# Patient Record
Sex: MALE | Race: WHITE | NOT HISPANIC OR LATINO | Employment: OTHER | URBAN - METROPOLITAN AREA
[De-identification: names, ages, dates, MRNs, and addresses within clinical notes are randomized per-mention and may not be internally consistent; named-entity substitution may affect disease eponyms.]

---

## 2018-03-13 NOTE — PROGRESS NOTES
Assessment    1  Encounter for preventive health examination (V70 0) (Z00 00)    Plan  Hyperlipidemia    · Simvastatin 20 MG Oral Tablet; TAKE 1 TABLET AT BEDTIME  Hypertension    · Lisinopril-Hydrochlorothiazide 20-12 5 MG Oral Tablet; TAKE ONE TABLET BY  MOUTH ONCE DAILY   · (1) COMPREHENSIVE METABOLIC PANEL; Status:Active; Requested LRZ:[id number];     Discussion/Summary  Impression: health maintenance visit  Prostate cancer screening: prostate cancer screening is current and 8/15 0 5  Colorectal cancer screening: colorectal cancer screening is current and 2/14  The do yearly Flu shot Do tetanus shot if cuts self  He was advised to be evaluated by an optometrist and a dentist      He has been feeling well, he will redo fasting CMP blood test and continue with medication as is  Recheck here 8 months, call sooner as needed  He will also see ENT in follow-up  Chief Complaint  reg yrly check      History of Present Illness  HPI: He was last here in September, he has been feeling well  He is using his blood pressure medication along with statin, blood pressure is well-controlled here today  He has not yet seen ENT in follow-up but plans to set this up No new issue w dysphagia/ speech /chronic hoarseness      Review of Systems    Constitutional: No fever or chills, feels well, no tiredness, no recent weight gain or weight loss  Eyes: No complaints of eye pain, no red eyes, no discharge from eyes, no itchy eyes  ENT: No increased hoarseness or difficulty swallowing, but no earache, no nosebleeds, no sore throat and no nasal discharge  Respiratory: No complaints of shortness of breath, no wheezing, no cough, no SOB on exertion, no orthopnea or PND  Genitourinary: no dysuria  Musculoskeletal: No complaints of arthralgia, no myalgias, no joint swelling or stiffness, no limb pain or swelling  Integumentary: No complaints of skin rash or skin lesions, no itching, no skin wound, no dry skin  Neurological: No compliants of headache, no confusion, no convulsions, no numbness or tingling, no dizziness or fainting, no limb weakness, no difficulty walking  Psychiatric: Is not suicidal, no sleep disturbances, no anxiety or depression, no change in personality, no emotional problems  Endocrine: No complaints of proptosis, no hot flashes, no muscle weakness, no erectile dysfunction, no deepening of the voice, no feelings of weakness  Hematologic/Lymphatic: No complaints of swollen glands, no swollen glands in the neck, does not bleed easily, no easy bruising  Active Problems    1  Back pain (724 5) (M54 9)   2  Hoarseness (784 42) (R49 0)   3  Hyperlipidemia (272 4) (E78 5)   4  Hypertension (401 9) (I10)   5  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)   6  Presbycusis (388 01) (H91 10)    Past Medical History    · History of Diverticulosis (562 10) (K57 90)   · History of acute sinusitis (V12 69) (Z87 09)   · Hyperlipidemia (272 4) (E78 5)   · Hypertension (401 9) (I10)   · History of Inguinal hernia (550 90) (K40 90)   · History of Uncomplicated alcohol abuse (305 00) (F10 10)    Surgical History    · History of Complete Colonoscopy   · History of Inguinal Hernia Repair   · History of Inguinal Hernia Repair    Family History  Mother    · Family history of Breast Cancer (V16 3)   · Family history of Hypertension (V17 49)  Father    · Family history of Colon Cancer (V16 0)   · Family history of lymphoma (V16 7) (Z80 2)    Social History    · Employment History: (V62 1)   · [employer]   · Former smoker (V15 82) (D45 092)   · stopped 1996   · Marital History - Single   · monogamous   · Stopped Drinking Alcohol   · quit 1996    Current Meds   1  Cetirizine HCl 10 MG CAPS; TAKE 1 CAPSULE Daily PRN allergy At Bedtime; Therapy: (Recorded:09Jun2016) to Recorded   2  Glucosamine-Chondroitin Oral Tablet; uses daily; Therapy: 92PCB1982 to (Last RB:[id number]) Ordered   3   Ibuprofen 800 MG Oral Tablet; uses sparingly for low back pain; Therapy: 29Apr2015 to Recorded   4  Lisinopril-Hydrochlorothiazide 20-12 5 MG Oral Tablet; TAKE ONE TABLET BY MOUTH   ONCE DAILY; Therapy: 06JPU1719 to (Evaluate:81Xwf9910)  Requested for: 24Yfe8483; Last   Rx:[id number] Ordered   5  Omega-3-6-9 Oral Capsule; uses daily; Therapy: 20JRX8782 to (Last UU:[id number]) Ordered   6  Simvastatin 20 MG Oral Tablet; TAKE 1 TABLET DAILY AT BEDTIME; Therapy: 64Arw6204 to (Evaluate:46Lme3498)  Requested for: 94QMQ9848; Last   Rx:03Mar2015 Ordered    Allergies    1  No Known Drug Allergies    Vitals   Recorded: 64JKU4871 03:29PM   Heart Rate 80   Systolic 512   Diastolic 64   Height 5 ft 11 5 in   Weight 191 lb 4 oz   BMI Calculated 26 3   BSA Calculated 2 08     Physical Exam    Constitutional   General appearance: No acute distress, well appearing and well nourished  Chronic hoarseness of voice appears same  Eyes   Conjunctiva and lids: No erythema, swelling or discharge  Ears, Nose, Mouth, and Throat   Oropharynx: Normal with no erythema, edema, exudate or lesions  Neck   Neck: Supple, symmetric, trachea midline, no masses  Thyroid: Normal, no thyromegaly  Pulmonary   Auscultation of lungs: Clear to auscultation  Cardiovascular   Auscultation of heart: Normal rate and rhythm, normal S1 and S2, no murmurs  Carotid pulses: 2+ bilaterally  No edema  Abdomen   Abdomen: Non-tender, no masses  Lymphatic   Palpation of lymph nodes in neck: No lymphadenopathy  Musculoskeletal   Gait and station: Normal     Stability: Normal     Neurologic   Cortical function: Normal mental status  Coordination: Normal finger to nose and heel to shin  Psychiatric   Judgment and insight: Normal     Orientation to person, place and time: Normal     Recent and remote memory: Intact      Mood and affect: Normal        Signatures   Electronically signed by : Nito Turner DO; Jun 9 2016  5:21PM EST                       (Author)

## 2024-09-26 ENCOUNTER — OFFICE VISIT (OUTPATIENT)
Dept: URGENT CARE | Facility: CLINIC | Age: 69
End: 2024-09-26
Payer: COMMERCIAL

## 2024-09-26 VITALS
HEIGHT: 72 IN | SYSTOLIC BLOOD PRESSURE: 119 MMHG | RESPIRATION RATE: 18 BRPM | OXYGEN SATURATION: 97 % | TEMPERATURE: 98 F | HEART RATE: 85 BPM | BODY MASS INDEX: 23.57 KG/M2 | DIASTOLIC BLOOD PRESSURE: 80 MMHG | WEIGHT: 174 LBS

## 2024-09-26 DIAGNOSIS — H61.21 HEARING LOSS OF RIGHT EAR DUE TO CERUMEN IMPACTION: ICD-10-CM

## 2024-09-26 DIAGNOSIS — S01.311A LACERATION OF RIGHT EAR CANAL, INITIAL ENCOUNTER: Primary | ICD-10-CM

## 2024-09-26 PROCEDURE — 69209 REMOVE IMPACTED EAR WAX UNI: CPT | Performed by: ORTHOPAEDIC SURGERY

## 2024-09-26 PROCEDURE — 99213 OFFICE O/P EST LOW 20 MIN: CPT | Performed by: ORTHOPAEDIC SURGERY

## 2024-09-26 RX ORDER — OFLOXACIN 3 MG/ML
10 SOLUTION AURICULAR (OTIC) 2 TIMES DAILY
Qty: 5 ML | Refills: 0 | Status: SHIPPED | OUTPATIENT
Start: 2024-09-26 | End: 2024-10-01

## 2024-09-26 RX ORDER — SIMVASTATIN 20 MG
20 TABLET ORAL
COMMUNITY

## 2024-09-26 RX ORDER — LISINOPRIL 10 MG/1
TABLET ORAL
COMMUNITY
Start: 2024-09-15

## 2024-09-26 NOTE — PROGRESS NOTES
[facility]        NAME: Kolton Castro is a 69 y.o. male  : 1955    MRN: [id number]  DATE: 2024  TIME: 9:32 AM    Assessment and Plan   Laceration of right ear canal, initial encounter [S01.311A]  1. Laceration of right ear canal, initial encounter  ofloxacin (FLOXIN) 0.3 % otic solution      2. Hearing loss of right ear due to cerumen impaction  Ear cerumen removal        Laceration of the floor of the right ear canal noted from prior attempt at cerumen removal by an outside provider. Cerumen successfully removed in clinic today with lavage without complication or pain. Due to presence of wound will provide patient with a short course of antibiotic ear drops prior to his hearing aid fitting.     Patient Instructions       Follow up with PCP in 3-5 days.  Proceed to  ER if symptoms worsen.    If tests are performed, our office will contact you with results only if changes need to made to the care plan discussed with you at the visit. [facility]    Chief Complaint     Chief Complaint   Patient presents with    Earache     Needs ear wax removed to get hearing aids         History of Present Illness       69 YOM presents for evaluation of ear wax removal. He states he was getting fitted for his new hearing aids and the provider/technician found ear wax in the right canal. They attempted to remove it but was unsuccessful and recommended the patient be seen at the urgent care for removal. The patient states that there was some pain following this first attempt, but this has resolved.         Review of Systems   Review of Systems   Constitutional:  Negative for chills and fever.   HENT:  Positive for hearing loss (right ear cerumen impaction). Negative for congestion, ear discharge, ear pain and sore throat.    Eyes:  Negative for pain and visual disturbance.   Respiratory:  Negative for cough and shortness of breath.    Cardiovascular:   Negative for chest pain and palpitations.   Gastrointestinal:  Negative for abdominal pain and vomiting.   Genitourinary:  Negative for dysuria and hematuria.   Musculoskeletal:  Negative for arthralgias and back pain.   Skin:  Negative for color change and rash.   Neurological:  Negative for seizures and syncope.   All other systems reviewed and are negative.        Current Medications       Current Outpatient Medications:     lisinopril (ZESTRIL) 10 mg tablet, , Disp: , Rfl:     ofloxacin (FLOXIN) 0.3 % otic solution, Administer 10 drops to the right ear 2 (two) times a day for 5 days, Disp: 5 mL, Rfl: 0    simvastatin (ZOCOR) 20 mg tablet, Take 20 mg by mouth, Disp: , Rfl:     Current Allergies     Allergies as of 09/26/2024    (No Known Allergies)            The following portions of the patient's history were reviewed and updated as appropriate: allergies, current medications, past family history, past medical history, past social history, past surgical history and problem list.     History reviewed. No pertinent past medical history.    History reviewed. No pertinent surgical history.    No family history on file.      Medications have been verified.        Objective   /80   Pulse 85   Temp 98 °F (36.7 °C) (Temporal)   Resp 18   Ht 6' (1.829 m)   Wt 78.9 kg (174 lb)   SpO2 97%   BMI 23.60 kg/m²        Physical Exam     Physical Exam  Vitals and nursing note reviewed.   Constitutional:       General: He is not in acute distress.     Appearance: Normal appearance. He is not ill-appearing.   HENT:      Head: Normocephalic and atraumatic.      Right Ear: Laceration present. There is impacted cerumen. Tympanic membrane is not perforated, erythematous, retracted or bulging.      Left Ear: There is no impacted cerumen. Tympanic membrane is not perforated, erythematous, retracted or bulging.      Ears:      Comments: Along the floor of the external canal, right ear, there is a small laceration with blood  "noted. Further into the canal there is a wax impaction.      Nose: Nose normal.      Mouth/Throat:      Mouth: Mucous membranes are moist.      Pharynx: Oropharynx is clear.   Eyes:      Extraocular Movements: Extraocular movements intact.      Pupils: Pupils are equal, round, and reactive to light.   Cardiovascular:      Rate and Rhythm: Normal rate.      Pulses: Normal pulses.   Pulmonary:      Effort: Pulmonary effort is normal. No respiratory distress.   Musculoskeletal:         General: Normal range of motion.      Cervical back: Normal range of motion.   Skin:     General: Skin is warm and dry.      Capillary Refill: Capillary refill takes less than 2 seconds.   Neurological:      General: No focal deficit present.      Mental Status: He is alert and oriented to person, place, and time.   Psychiatric:         Mood and Affect: Mood normal.         Behavior: Behavior normal.         Ear cerumen removal    Date/Time: 9/26/2024 8:40 AM    Performed by: Toya Daniles PA-C  Authorized by: Toya Daniels PA-C  [place] Protocol:  procedure performed by consultantConsent: Verbal consent obtained.  Consent given by: patient  Time out: Immediately prior to procedure a \"time out\" was called to verify the correct patient, procedure, equipment, support staff and site/side marked as required.  Timeout called at: 9/26/2024 9:31 AM.  Patient understanding: patient states understanding of the procedure being performed  Patient identity confirmed: verbally with patient    Patient location:  Clinic  Procedure details:     Location:  R ear    Procedure type: irrigation only    Post-procedure details:     Complication:  None    Hearing quality:  Improved    Patient tolerance of procedure:  Tolerated well, no immediate complications              "